# Patient Record
Sex: FEMALE | Race: WHITE | NOT HISPANIC OR LATINO | Employment: FULL TIME
[De-identification: names, ages, dates, MRNs, and addresses within clinical notes are randomized per-mention and may not be internally consistent; named-entity substitution may affect disease eponyms.]

---

## 2019-05-15 ENCOUNTER — TELEPHONE (OUTPATIENT)
Dept: SCHEDULING | Age: 29
End: 2019-05-15

## 2019-05-16 ENCOUNTER — TELEPHONE (OUTPATIENT)
Dept: SCHEDULING | Age: 29
End: 2019-05-16

## 2019-05-16 ENCOUNTER — WALK IN (OUTPATIENT)
Dept: URGENT CARE | Age: 29
End: 2019-05-16

## 2019-05-16 DIAGNOSIS — Z23 NEED FOR VARICELLA VACCINE: Primary | ICD-10-CM

## 2019-05-16 DIAGNOSIS — Z01.84 ENCOUNTER FOR ANTIBODY RESPONSE EXAMINATION: ICD-10-CM

## 2019-05-16 PROCEDURE — 90471 IMMUNIZATION ADMIN: CPT | Performed by: NURSE PRACTITIONER

## 2019-05-16 PROCEDURE — 90716 VAR VACCINE LIVE SUBQ: CPT | Performed by: NURSE PRACTITIONER

## 2019-06-10 ENCOUNTER — TELEPHONE (OUTPATIENT)
Dept: SCHEDULING | Age: 29
End: 2019-06-10

## 2019-06-18 ENCOUNTER — TELEPHONE (OUTPATIENT)
Dept: SCHEDULING | Age: 29
End: 2019-06-18

## 2019-06-20 ENCOUNTER — WALK IN (OUTPATIENT)
Dept: URGENT CARE | Age: 29
End: 2019-06-20

## 2019-06-20 DIAGNOSIS — Z23 NEED FOR VARICELLA VACCINE: Primary | ICD-10-CM

## 2019-06-20 PROCEDURE — 90716 VAR VACCINE LIVE SUBQ: CPT | Performed by: NURSE PRACTITIONER

## 2019-06-20 PROCEDURE — 90471 IMMUNIZATION ADMIN: CPT | Performed by: NURSE PRACTITIONER

## 2019-06-20 RX ORDER — NORGESTIMATE AND ETHINYL ESTRADIOL 7DAYSX3 28
1 KIT ORAL DAILY
COMMUNITY
Start: 2019-05-22 | End: 2021-12-23

## 2019-07-16 ENCOUNTER — TELEPHONE (OUTPATIENT)
Dept: SCHEDULING | Age: 29
End: 2019-07-16

## 2019-07-18 ENCOUNTER — LAB SERVICES (OUTPATIENT)
Dept: LAB | Age: 29
End: 2019-07-18

## 2019-07-18 DIAGNOSIS — Z01.84 ENCOUNTER FOR ANTIBODY RESPONSE EXAMINATION: ICD-10-CM

## 2019-07-18 PROCEDURE — 36415 COLL VENOUS BLD VENIPUNCTURE: CPT | Performed by: NURSE PRACTITIONER

## 2019-07-18 PROCEDURE — 86787 VARICELLA-ZOSTER ANTIBODY: CPT | Performed by: NURSE PRACTITIONER

## 2019-07-19 LAB — VZV IGG SER IA-ACNC: NORMAL

## 2020-10-06 ENCOUNTER — LAB REQUISITION (OUTPATIENT)
Age: 30
End: 2020-10-06
Payer: COMMERCIAL

## 2020-10-06 DIAGNOSIS — Z20.828 CONTACT WITH AND (SUSPECTED) EXPOSURE TO OTHER VIRAL COMMUNICABLE DISEASES: ICD-10-CM

## 2020-10-09 NOTE — PROGRESS NOTES
Results reviewed and noted to be negative. Appropriate [facility] personnel responsible for documenting and following-up with client notified of test results and need to communicate such results to the client.

## 2021-01-23 ENCOUNTER — IMMUNIZATION (OUTPATIENT)
Dept: LAB | Age: 31
End: 2021-01-23

## 2021-01-23 DIAGNOSIS — Z23 NEED FOR VACCINATION: Primary | ICD-10-CM

## 2021-01-23 PROCEDURE — 91301 COVID-19 MODERNA VACCINE: CPT

## 2021-01-23 PROCEDURE — 0011A COVID-19 MODERNA VACCINE: CPT

## 2021-02-20 ENCOUNTER — IMMUNIZATION (OUTPATIENT)
Dept: LAB | Age: 31
End: 2021-02-20

## 2021-02-20 DIAGNOSIS — Z23 NEED FOR VACCINATION: Primary | ICD-10-CM

## 2021-02-20 PROCEDURE — 91301 COVID-19 MODERNA VACCINE: CPT | Performed by: INTERNAL MEDICINE

## 2021-02-20 PROCEDURE — 0012A COVID-19 MODERNA VACCINE: CPT | Performed by: INTERNAL MEDICINE

## 2021-11-11 ENCOUNTER — LAB REQUISITION (OUTPATIENT)
Dept: LAB | Age: 31
End: 2021-11-11

## 2021-11-11 DIAGNOSIS — Z01.84 ENCOUNTER FOR ANTIBODY RESPONSE EXAMINATION: ICD-10-CM

## 2021-11-11 PROCEDURE — 86480 TB TEST CELL IMMUN MEASURE: CPT | Performed by: CLINICAL MEDICAL LABORATORY

## 2021-11-11 PROCEDURE — PSEU9049 QUANTIFERON TB PLUS: Performed by: CLINICAL MEDICAL LABORATORY

## 2021-11-13 LAB
GAMMA INTERFERON BACKGROUND BLD IA-ACNC: 0.02 IU/ML
M TB IFN-G BLD-IMP: NEGATIVE
M TB IFN-G CD4+ BCKGRND COR BLD-ACNC: 0 IU/ML
M TB IFN-G CD4+CD8+ BCKGRND COR BLD-ACNC: 0 IU/ML
MITOGEN IGNF BCKGRD COR BLD-ACNC: 9.98 IU/ML

## 2021-12-06 ENCOUNTER — EMPLOYEE HEALTH (OUTPATIENT)
Dept: OTHER | Age: 31
End: 2021-12-06

## 2021-12-07 ENCOUNTER — EMPLOYEE HEALTH (OUTPATIENT)
Dept: OTHER | Age: 31
End: 2021-12-07

## 2021-12-09 ENCOUNTER — EMPLOYEE HEALTH (OUTPATIENT)
Dept: OTHER | Age: 31
End: 2021-12-09

## 2021-12-09 DIAGNOSIS — Z11.59 SCREENING FOR VIRAL DISEASE: Primary | ICD-10-CM

## 2021-12-17 ENCOUNTER — EMPLOYEE HEALTH (OUTPATIENT)
Dept: OTHER | Age: 31
End: 2021-12-17

## 2021-12-23 ENCOUNTER — OFFICE VISIT (OUTPATIENT)
Dept: OBGYN | Age: 31
End: 2021-12-23

## 2021-12-23 VITALS
SYSTOLIC BLOOD PRESSURE: 90 MMHG | TEMPERATURE: 98.2 F | HEART RATE: 61 BPM | BODY MASS INDEX: 22.48 KG/M2 | DIASTOLIC BLOOD PRESSURE: 49 MMHG | RESPIRATION RATE: 16 BRPM | WEIGHT: 139.9 LBS | HEIGHT: 66 IN

## 2021-12-23 DIAGNOSIS — Z01.419 GYNECOLOGIC EXAM NORMAL: Primary | ICD-10-CM

## 2021-12-23 PROCEDURE — 88175 CYTOPATH C/V AUTO FLUID REDO: CPT | Performed by: CLINICAL MEDICAL LABORATORY

## 2021-12-23 PROCEDURE — 87591 N.GONORRHOEAE DNA AMP PROB: CPT | Performed by: PSYCHIATRY & NEUROLOGY

## 2021-12-23 PROCEDURE — 87491 CHLMYD TRACH DNA AMP PROBE: CPT | Performed by: PSYCHIATRY & NEUROLOGY

## 2021-12-23 PROCEDURE — 99385 PREV VISIT NEW AGE 18-39: CPT | Performed by: OBSTETRICS & GYNECOLOGY

## 2021-12-23 PROCEDURE — 87624 HPV HI-RISK TYP POOLED RSLT: CPT | Performed by: CLINICAL MEDICAL LABORATORY

## 2021-12-23 ASSESSMENT — PAIN SCALES - GENERAL: PAINLEVEL: 0

## 2021-12-27 LAB
C TRACH RRNA SPEC QL NAA+PROBE: NEGATIVE
Lab: NORMAL
N GONORRHOEA RRNA SPEC QL NAA+PROBE: NEGATIVE

## 2021-12-28 LAB
CASE RPRT: NORMAL
CLINICAL INFO: NORMAL
CYTOLOGY CVX/VAG STUDY: NORMAL
HPV16+18+45 E6+E7MRNA CVX NAA+PROBE: NEGATIVE
Lab: NORMAL
PAP EDUCATIONAL NOTE: NORMAL
SPECIMEN ADEQUACY: NORMAL

## 2022-08-11 ENCOUNTER — LAB SERVICES (OUTPATIENT)
Dept: LAB | Age: 32
End: 2022-08-11

## 2022-08-23 ENCOUNTER — EMPLOYEE HEALTH (OUTPATIENT)
Dept: OTHER | Age: 32
End: 2022-08-23

## 2022-08-24 ENCOUNTER — EMPLOYEE HEALTH (OUTPATIENT)
Dept: OTHER | Age: 32
End: 2022-08-24

## 2022-08-25 ENCOUNTER — EMPLOYEE HEALTH (OUTPATIENT)
Dept: OTHER | Age: 32
End: 2022-08-25

## 2022-08-27 ENCOUNTER — EMPLOYEE HEALTH (OUTPATIENT)
Dept: OTHER | Age: 32
End: 2022-08-27

## 2022-10-31 ENCOUNTER — APPOINTMENT (RX ONLY)
Dept: URBAN - METROPOLITAN AREA CLINIC 173 | Facility: CLINIC | Age: 32
Setting detail: DERMATOLOGY
End: 2022-10-31

## 2022-10-31 DIAGNOSIS — L81.1 CHLOASMA: ICD-10-CM

## 2022-10-31 DIAGNOSIS — Z41.9 ENCOUNTER FOR PROCEDURE FOR PURPOSES OTHER THAN REMEDYING HEALTH STATE, UNSPECIFIED: ICD-10-CM

## 2022-10-31 PROCEDURE — ? COUNSELING

## 2022-10-31 PROCEDURE — ? PULSED-DYE LASER

## 2022-10-31 PROCEDURE — ? BOTOX

## 2022-10-31 PROCEDURE — ? FRAXEL

## 2022-10-31 PROCEDURE — ? TREATMENT REGIMEN

## 2022-10-31 PROCEDURE — ? PRESCRIPTION

## 2022-10-31 RX ADMIN — Medication: at 00:00

## 2022-10-31 ASSESSMENT — LOCATION DETAILED DESCRIPTION DERM
LOCATION DETAILED: LEFT CENTRAL MALAR CHEEK
LOCATION DETAILED: RIGHT CENTRAL MALAR CHEEK

## 2022-10-31 ASSESSMENT — LOCATION SIMPLE DESCRIPTION DERM
LOCATION SIMPLE: RIGHT CHEEK
LOCATION SIMPLE: LEFT CHEEK

## 2022-10-31 ASSESSMENT — LOCATION ZONE DERM: LOCATION ZONE: FACE

## 2022-10-31 NOTE — PROCEDURE: BOTOX
Forehead Units: 4
Additional Area 1 Location: face
Show Orbicularis Oculi Units: Yes
Lateral Platysmal Bands Units: 0
Show Right And Left Pupillary Line Units: No
Glabellar Complex Units: 16
Expiration Date (Month Year): 11/24
Detail Level: Detailed
Price (Use Numbers Only, No Special Characters Or $): 099
Periorbital Skin Units: 8
Consent: Written consent obtained. Risks include but not limited to lid/brow ptosis, bruising, swelling and incomplete chemical denervation.
Lot #: [id number]

## 2022-10-31 NOTE — PROCEDURE: PULSED-DYE LASER
Immediate Endpoint: erythema
Spot Size: 7 mm
Spot Size: 10 mm
Cryogen Time (Ms): 30
Cryogen Time (Ms): 30
Pulse Duration: 10 ms
Delay Time (Ms): 20
Delay Time (Ms): 20
Laser Type: Vbeam 595nm
Fluence In J/Cm2 (Optional): 7.75
Post-Care Instructions: I reviewed with the patient in detail post-care instructions. Patient should stay away from the sun and wear sun protection until treated areas are fully healed.
Post-Procedure Care: Ice applied after treatment. Post care reviewed with patient.
Consent: Written consent obtained, risks reviewed including but not limited to crusting, scabbing, blistering, scarring, darker or lighter pigmentary change, incidental hair removal, bruising, and/or incomplete removal.
Location Override: chest
Pulse Duration: 6 ms
Price (Use Numbers Only, No Special Characters Or $): 0
Detail Level: Zone
Fluence In J/Cm2 (Optional): 7.00

## 2022-10-31 NOTE — PROCEDURE: FRAXEL
Number Of Passes: 1
External Cooling Fan Speed: 5
Total Coverage: 20%
Was An Eye Shield Used?: No
Medium Plastic Eye Shield Text: The ocular mucosa was anesthetized with tetracaine. Once adequate anesthesia was optained, medium plastic eye shields were inserted and remained in place until the procedure was completed.
Energy(Mj/Cm2): 40
Post-Care Instructions: I reviewed with the patient in detail post-care instructions. Patient should avoid sun until area fully healed.
Treatment Level: 4
Small Metal Eye Shield Text: The ocular mucosa was anesthetized with tetracaine. Once adequate anesthesia was optained, small metal eye shields were inserted and remained in place until the procedure was completed.
Detail Level: Zone
Total Energy In Kj (Optional- Don't Include Units): 0.75
Wavelength: 1927nm
Number Of Passes: 8
Large Plastic Eye Shield Text: The ocular mucosa was anesthetized with tetracaine. Once adequate anesthesia was optained, large plastic eye shields were inserted and remained in place until the procedure was completed.
Indication: resurfacing
Medium Metal Eye Shield Text: The ocular mucosa was anesthetized with tetracaine. Once adequate anesthesia was optained, medium metal eye shields were inserted and remained in place until the procedure was completed.
Tip: 15mm
Energy(Mj/Cm2): 10
Location: full face
Large Metal Eye Shield Text: The ocular mucosa was anesthetized with tetracaine. Once adequate anesthesia was optained, large metal eye shields were inserted and remained in place until the procedure was completed.
Location: neck
Topical Anesthesia Type: 30% lidocaine, plasticized base
Consent: Written consent obtained, risks reviewed including but not limited to pain and incomplete improvement.
Depth In Microns (Use Numbers Only, No Special Characters Or $): 196
Small Plastic Eye Shield Text: The ocular mucosa was anesthetized with tetracaine. Once adequate anesthesia was optained, small plastic eye shields were inserted and remained in place until the procedure was completed.

## 2022-10-31 NOTE — PROCEDURE: TREATMENT REGIMEN
Detail Level: Zone
Initiate Treatment: Start hydroquinone 8%, tretinoin 0.025%, kojic acid 1%, niacinamide 4%, fluocinolone 0.025% cream in W06 base.  Apply a thin layer nightly for 3 months.\\n\\nStart tretinoin 0.025% gel after completion of hydroquinone treatment

## 2022-11-01 RX ORDER — PHARMACY COMPOUNDING ACCESSORY
G EACH MISCELLANEOUS NIGHTLY
Qty: 30 | Refills: 4 | Status: ERX

## 2022-11-01 RX ORDER — PHARMACY COMPOUNDING ACCESSORY
EACH MISCELLANEOUS
Qty: 30 | Refills: 4 | Status: ERX | COMMUNITY
Start: 2022-10-31

## 2022-12-23 ENCOUNTER — OFFICE VISIT (OUTPATIENT)
Dept: OBGYN | Age: 32
End: 2022-12-23

## 2022-12-23 ENCOUNTER — APPOINTMENT (OUTPATIENT)
Dept: OBGYN | Age: 32
End: 2022-12-23

## 2022-12-23 VITALS
HEART RATE: 71 BPM | DIASTOLIC BLOOD PRESSURE: 54 MMHG | WEIGHT: 142.31 LBS | HEIGHT: 66 IN | BODY MASS INDEX: 22.87 KG/M2 | TEMPERATURE: 98.4 F | RESPIRATION RATE: 16 BRPM | SYSTOLIC BLOOD PRESSURE: 95 MMHG

## 2022-12-23 DIAGNOSIS — Z01.419 GYNECOLOGIC EXAM NORMAL: Primary | ICD-10-CM

## 2022-12-23 PROCEDURE — 99395 PREV VISIT EST AGE 18-39: CPT | Performed by: OBSTETRICS & GYNECOLOGY

## 2022-12-23 ASSESSMENT — PAIN SCALES - GENERAL: PAINLEVEL: 0

## 2023-04-18 ENCOUNTER — RX ONLY (OUTPATIENT)
Age: 33
Setting detail: RX ONLY
End: 2023-04-18

## 2023-04-18 ENCOUNTER — APPOINTMENT (RX ONLY)
Dept: URBAN - METROPOLITAN AREA CLINIC 173 | Facility: CLINIC | Age: 33
Setting detail: DERMATOLOGY
End: 2023-04-18

## 2023-04-18 DIAGNOSIS — Z41.9 ENCOUNTER FOR PROCEDURE FOR PURPOSES OTHER THAN REMEDYING HEALTH STATE, UNSPECIFIED: ICD-10-CM

## 2023-04-18 PROCEDURE — ? FRAXEL

## 2023-04-18 PROCEDURE — ? BOTOX

## 2023-04-18 RX ORDER — PHARMACY COMPOUNDING ACCESSORY
EACH MISCELLANEOUS
Qty: 60 | Refills: 2 | Status: ERX

## 2023-04-18 NOTE — PROCEDURE: BOTOX
Forehead Units: 0
Additional Area 1 Location: face
Show Orbicularis Oculi Units: Yes
Dilution (U/0.1 Cc): 4
Show Right And Left Pupillary Line Units: No
Additional Area 1 Units: 18
Expiration Date (Month Year): 11/24
Detail Level: Detailed
Price (Use Numbers Only, No Special Characters Or $): 379
Consent: Written consent obtained. Risks include but not limited to lid/brow ptosis, bruising, swelling and incomplete chemical denervation.
Lot #: [id number]

## 2023-04-18 NOTE — PROCEDURE: FRAXEL
Number Of Passes: 1
External Cooling Fan Speed: 5
Total Coverage: 20%
Length Of Topical Anesthesia Application (Optional): 60 minutes
Price (Use Numbers Only, No Special Characters Or $): 914
Was An Eye Shield Used?: No
Medium Plastic Eye Shield Text: The ocular mucosa was anesthetized with tetracaine. Once adequate anesthesia was optained, medium plastic eye shields were inserted and remained in place until the procedure was completed.
Energy(Mj/Cm2): 40
Post-Care Instructions: I reviewed with the patient in detail post-care instructions. Patient should avoid sun until area fully healed.
Treatment Level: 4
Small Metal Eye Shield Text: The ocular mucosa was anesthetized with tetracaine. Once adequate anesthesia was optained, small metal eye shields were inserted and remained in place until the procedure was completed.
Detail Level: Zone
Total Energy In Kj (Optional- Don't Include Units): 0.71
Wavelength: 1927nm
Number Of Passes: 8
Large Plastic Eye Shield Text: The ocular mucosa was anesthetized with tetracaine. Once adequate anesthesia was optained, large plastic eye shields were inserted and remained in place until the procedure was completed.
Indication: resurfacing
Medium Metal Eye Shield Text: The ocular mucosa was anesthetized with tetracaine. Once adequate anesthesia was optained, medium metal eye shields were inserted and remained in place until the procedure was completed.
Tip: 15mm
Energy(Mj/Cm2): 10
Treatment Number: 2
Location: full face
Large Metal Eye Shield Text: The ocular mucosa was anesthetized with tetracaine. Once adequate anesthesia was optained, large metal eye shields were inserted and remained in place until the procedure was completed.
Location: neck
Topical Anesthesia Type: 30% lidocaine, plasticized base
Consent: Written consent obtained, risks reviewed including but not limited to pain and incomplete improvement.
Depth In Microns (Use Numbers Only, No Special Characters Or $): 196
Small Plastic Eye Shield Text: The ocular mucosa was anesthetized with tetracaine. Once adequate anesthesia was optained, small plastic eye shields were inserted and remained in place until the procedure was completed.

## 2023-05-13 ENCOUNTER — IMAGING SERVICES (OUTPATIENT)
Dept: GENERAL RADIOLOGY | Age: 33
End: 2023-05-13
Attending: EMERGENCY MEDICINE

## 2023-05-13 ENCOUNTER — WALK IN (OUTPATIENT)
Dept: URGENT CARE | Age: 33
End: 2023-05-13

## 2023-05-13 VITALS
WEIGHT: 142.53 LBS | BODY MASS INDEX: 23 KG/M2 | DIASTOLIC BLOOD PRESSURE: 69 MMHG | SYSTOLIC BLOOD PRESSURE: 106 MMHG | TEMPERATURE: 97.6 F | HEART RATE: 72 BPM | OXYGEN SATURATION: 99 %

## 2023-05-13 DIAGNOSIS — M79.672 LEFT FOOT PAIN: ICD-10-CM

## 2023-05-13 DIAGNOSIS — M79.672 LEFT FOOT PAIN: Primary | ICD-10-CM

## 2023-05-13 PROCEDURE — 73630 X-RAY EXAM OF FOOT: CPT | Performed by: RADIOLOGY

## 2023-05-13 PROCEDURE — 99213 OFFICE O/P EST LOW 20 MIN: CPT | Performed by: EMERGENCY MEDICINE

## 2023-05-13 RX ORDER — SPIRONOLACTONE 100 MG/1
100 TABLET, FILM COATED ORAL AT BEDTIME
COMMUNITY
Start: 2023-04-19

## 2023-05-14 ASSESSMENT — ENCOUNTER SYMPTOMS
WEAKNESS: 0
RHINORRHEA: 0
LIGHT-HEADEDNESS: 0
NAUSEA: 0
VOMITING: 0
COUGH: 0
CONSTIPATION: 0
CHILLS: 0
SORE THROAT: 0
SHORTNESS OF BREATH: 0
FATIGUE: 0
BLOOD IN STOOL: 0
SINUS PRESSURE: 0
DIARRHEA: 0
TROUBLE SWALLOWING: 0
FEVER: 0
WHEEZING: 0
HEADACHES: 0
DIZZINESS: 0
ABDOMINAL PAIN: 0
ACTIVITY CHANGE: 0

## 2023-08-21 ENCOUNTER — TELEPHONE (OUTPATIENT)
Dept: OTHER | Age: 33
End: 2023-08-21

## 2023-08-29 ENCOUNTER — OFFICE VISIT (OUTPATIENT)
Dept: GASTROENTEROLOGY | Age: 33
End: 2023-08-29

## 2023-08-29 VITALS
BODY MASS INDEX: 22.61 KG/M2 | HEART RATE: 60 BPM | WEIGHT: 140.7 LBS | HEIGHT: 66 IN | SYSTOLIC BLOOD PRESSURE: 95 MMHG | DIASTOLIC BLOOD PRESSURE: 61 MMHG

## 2023-08-29 DIAGNOSIS — K58.1 IRRITABLE BOWEL SYNDROME WITH CONSTIPATION: Primary | ICD-10-CM

## 2023-08-29 PROCEDURE — 99204 OFFICE O/P NEW MOD 45 MIN: CPT | Performed by: INTERNAL MEDICINE

## 2023-08-30 ENCOUNTER — TELEPHONE (OUTPATIENT)
Dept: GASTROENTEROLOGY | Age: 33
End: 2023-08-30

## 2023-08-30 DIAGNOSIS — K58.1 IRRITABLE BOWEL SYNDROME WITH CONSTIPATION: Primary | ICD-10-CM

## 2023-09-01 ENCOUNTER — TELEPHONE (OUTPATIENT)
Dept: OBGYN | Age: 33
End: 2023-09-01

## 2023-09-01 ENCOUNTER — OFFICE VISIT (OUTPATIENT)
Dept: OBGYN | Age: 33
End: 2023-09-01

## 2023-09-01 VITALS
HEART RATE: 70 BPM | DIASTOLIC BLOOD PRESSURE: 66 MMHG | TEMPERATURE: 98.1 F | BODY MASS INDEX: 22.71 KG/M2 | WEIGHT: 141.3 LBS | HEIGHT: 66 IN | SYSTOLIC BLOOD PRESSURE: 101 MMHG

## 2023-09-01 DIAGNOSIS — Z30.011 ENCOUNTER FOR INITIAL PRESCRIPTION OF CONTRACEPTIVE PILLS: ICD-10-CM

## 2023-09-01 DIAGNOSIS — Z30.432 ENCOUNTER FOR IUD REMOVAL: Primary | ICD-10-CM

## 2023-09-01 PROCEDURE — 58301 REMOVE INTRAUTERINE DEVICE: CPT | Performed by: OBSTETRICS & GYNECOLOGY

## 2023-09-01 RX ORDER — NORGESTIMATE AND ETHINYL ESTRADIOL 7DAYSX3 28
1 KIT ORAL DAILY
Qty: 84 TABLET | Refills: 3 | Status: SHIPPED | OUTPATIENT
Start: 2023-09-01

## 2023-09-01 RX ORDER — NORGESTIMATE AND ETHINYL ESTRADIOL 7DAYSX3 28
1 KIT ORAL DAILY
Qty: 84 TABLET | OUTPATIENT
Start: 2023-09-01

## 2023-09-01 RX ORDER — NORGESTIMATE AND ETHINYL ESTRADIOL 7DAYSX3 28
1 KIT ORAL DAILY
Qty: 30 TABLET | Refills: 0 | Status: SHIPPED | OUTPATIENT
Start: 2023-09-01

## 2023-09-01 ASSESSMENT — PAIN SCALES - GENERAL: PAINLEVEL: 0

## 2023-10-12 ENCOUNTER — EXTERNAL RECORD (OUTPATIENT)
Dept: HEALTH INFORMATION MANAGEMENT | Facility: OTHER | Age: 33
End: 2023-10-12

## 2023-10-12 ENCOUNTER — HOSPITAL ENCOUNTER (OUTPATIENT)
Dept: GASTROENTEROLOGY | Age: 33
Discharge: HOME | End: 2023-10-12
Attending: INTERNAL MEDICINE

## 2023-10-12 DIAGNOSIS — K58.1 IRRITABLE BOWEL SYNDROME WITH CONSTIPATION: ICD-10-CM

## 2023-10-12 PROCEDURE — 91122 ANAL PRESSURE RECORD: CPT | Performed by: STUDENT IN AN ORGANIZED HEALTH CARE EDUCATION/TRAINING PROGRAM

## 2023-10-12 PROCEDURE — 13000023 HB GI BASIC CASE EA ADD MINUTE

## 2023-10-12 PROCEDURE — 91120 RECTAL SENSATION TONE & COMPLIANCE TEST: CPT | Performed by: STUDENT IN AN ORGANIZED HEALTH CARE EDUCATION/TRAINING PROGRAM

## 2023-10-12 PROCEDURE — 13000022 HB GI BASIC CASE  S/U +1ST 15 MIN

## 2023-11-25 ENCOUNTER — E-ADVICE (OUTPATIENT)
Dept: OBGYN | Age: 33
End: 2023-11-25

## 2023-11-25 DIAGNOSIS — Z30.011 ENCOUNTER FOR INITIAL PRESCRIPTION OF CONTRACEPTIVE PILLS: Primary | ICD-10-CM

## 2023-11-27 RX ORDER — NORGESTIMATE AND ETHINYL ESTRADIOL 7DAYSX3 28
1 KIT ORAL DAILY
Qty: 84 TABLET | Refills: 0 | Status: SHIPPED | OUTPATIENT
Start: 2023-11-27

## 2023-12-07 ENCOUNTER — CLINICAL ABSTRACT (OUTPATIENT)
Dept: HEALTH INFORMATION MANAGEMENT | Facility: OTHER | Age: 33
End: 2023-12-07

## 2023-12-07 ENCOUNTER — APPOINTMENT (OUTPATIENT)
Dept: GASTROENTEROLOGY | Age: 33
End: 2023-12-07

## 2023-12-07 VITALS
DIASTOLIC BLOOD PRESSURE: 63 MMHG | HEIGHT: 66 IN | SYSTOLIC BLOOD PRESSURE: 101 MMHG | BODY MASS INDEX: 22.64 KG/M2 | WEIGHT: 140.9 LBS

## 2023-12-07 DIAGNOSIS — K58.1 IRRITABLE BOWEL SYNDROME WITH CONSTIPATION: Primary | ICD-10-CM

## 2023-12-07 DIAGNOSIS — K63.8219 SMALL INTESTINAL BACTERIAL OVERGROWTH (SIBO): ICD-10-CM

## 2023-12-07 PROCEDURE — 99214 OFFICE O/P EST MOD 30 MIN: CPT | Performed by: INTERNAL MEDICINE

## 2023-12-28 ENCOUNTER — APPOINTMENT (OUTPATIENT)
Dept: OBGYN | Age: 33
End: 2023-12-28

## 2023-12-29 ENCOUNTER — E-ADVICE (OUTPATIENT)
Dept: OTHER | Age: 33
End: 2023-12-29

## 2024-01-08 ENCOUNTER — APPOINTMENT (OUTPATIENT)
Dept: OBGYN | Age: 34
End: 2024-01-08

## 2024-01-08 VITALS
HEIGHT: 66 IN | RESPIRATION RATE: 16 BRPM | WEIGHT: 141 LBS | DIASTOLIC BLOOD PRESSURE: 54 MMHG | SYSTOLIC BLOOD PRESSURE: 108 MMHG | HEART RATE: 59 BPM | BODY MASS INDEX: 22.66 KG/M2

## 2024-01-08 DIAGNOSIS — Z30.011 ENCOUNTER FOR INITIAL PRESCRIPTION OF CONTRACEPTIVE PILLS: ICD-10-CM

## 2024-01-08 DIAGNOSIS — Z01.419 GYNECOLOGIC EXAM NORMAL: Primary | ICD-10-CM

## 2024-01-08 PROCEDURE — 87624 HPV HI-RISK TYP POOLED RSLT: CPT | Performed by: CLINICAL MEDICAL LABORATORY

## 2024-01-08 PROCEDURE — 88175 CYTOPATH C/V AUTO FLUID REDO: CPT | Performed by: INTERNAL MEDICINE

## 2024-01-08 PROCEDURE — 99395 PREV VISIT EST AGE 18-39: CPT | Performed by: OBSTETRICS & GYNECOLOGY

## 2024-01-08 RX ORDER — NORGESTIMATE AND ETHINYL ESTRADIOL 7DAYSX3 28
1 KIT ORAL DAILY
Qty: 84 TABLET | Refills: 3 | Status: SHIPPED | OUTPATIENT
Start: 2024-01-08

## 2024-01-08 ASSESSMENT — PAIN SCALES - GENERAL: PAINLEVEL: 0

## 2024-01-15 LAB
CASE RPRT: ABNORMAL
CLINICAL INFO: ABNORMAL
CYTOLOGY CVX/VAG STUDY: ABNORMAL
CYTOLOGY CVX/VAG STUDY: ABNORMAL
GEN CATEG CVX/VAG CYTO-IMP: ABNORMAL
HPV16+18+45 E6+E7MRNA CVX NAA+PROBE: NEGATIVE
Lab: NORMAL
PAP EDUCATIONAL NOTE: ABNORMAL
SPECIMEN ADEQUACY: ABNORMAL

## 2024-01-18 ENCOUNTER — TELEPHONE (OUTPATIENT)
Dept: OBGYN | Age: 34
End: 2024-01-18

## 2024-01-18 ENCOUNTER — E-ADVICE (OUTPATIENT)
Dept: OBGYN | Age: 34
End: 2024-01-18

## 2024-02-01 ENCOUNTER — TELEPHONE (OUTPATIENT)
Dept: OBGYN | Age: 34
End: 2024-02-01

## 2024-02-05 ENCOUNTER — APPOINTMENT (OUTPATIENT)
Dept: OBGYN | Age: 34
End: 2024-02-05

## 2024-02-05 VITALS
RESPIRATION RATE: 16 BRPM | WEIGHT: 143.9 LBS | DIASTOLIC BLOOD PRESSURE: 66 MMHG | HEIGHT: 66 IN | BODY MASS INDEX: 23.13 KG/M2 | SYSTOLIC BLOOD PRESSURE: 109 MMHG | HEART RATE: 74 BPM

## 2024-02-05 DIAGNOSIS — R87.612 LGSIL ON PAP SMEAR OF CERVIX: Primary | ICD-10-CM

## 2024-02-05 DIAGNOSIS — Z11.51 SCREENING FOR HPV (HUMAN PAPILLOMAVIRUS): ICD-10-CM

## 2024-02-05 LAB
B-HCG UR QL: NEGATIVE
INTERNAL PROCEDURAL CONTROLS ACCEPTABLE: YES
TEST LOT EXPIRATION DATE: NORMAL
TEST LOT NUMBER: NORMAL

## 2024-02-05 PROCEDURE — 57454 BX/CURETT OF CERVIX W/SCOPE: CPT | Performed by: OBSTETRICS & GYNECOLOGY

## 2024-02-05 PROCEDURE — 81025 URINE PREGNANCY TEST: CPT | Performed by: OBSTETRICS & GYNECOLOGY

## 2024-02-05 ASSESSMENT — PAIN SCALES - GENERAL: PAINLEVEL: 0

## 2024-02-08 LAB
ASR DISCLAIMER: NORMAL
CASE RPRT: NORMAL
CLINICAL INFO: NORMAL
PATH REPORT.FINAL DX SPEC: NORMAL
PATH REPORT.GROSS SPEC: NORMAL

## 2024-02-12 ENCOUNTER — TELEPHONE (OUTPATIENT)
Dept: GASTROENTEROLOGY | Age: 34
End: 2024-02-12

## 2024-03-14 ENCOUNTER — APPOINTMENT (OUTPATIENT)
Dept: GASTROENTEROLOGY | Age: 34
End: 2024-03-14

## 2024-03-27 ENCOUNTER — APPOINTMENT (OUTPATIENT)
Dept: INTERNAL MEDICINE | Age: 34
End: 2024-03-27

## 2024-03-28 ENCOUNTER — APPOINTMENT (OUTPATIENT)
Dept: GASTROENTEROLOGY | Age: 34
End: 2024-03-28

## 2024-03-28 VITALS
DIASTOLIC BLOOD PRESSURE: 61 MMHG | BODY MASS INDEX: 22.76 KG/M2 | HEART RATE: 62 BPM | WEIGHT: 141.6 LBS | SYSTOLIC BLOOD PRESSURE: 100 MMHG | HEIGHT: 66 IN

## 2024-03-28 DIAGNOSIS — K59.04 CHRONIC IDIOPATHIC CONSTIPATION: Primary | ICD-10-CM

## 2024-03-28 DIAGNOSIS — K63.8219 SMALL INTESTINAL BACTERIAL OVERGROWTH (SIBO): ICD-10-CM

## 2024-03-28 DIAGNOSIS — K58.1 IRRITABLE BOWEL SYNDROME WITH CONSTIPATION: ICD-10-CM

## 2024-03-28 DIAGNOSIS — R14.0 BLOATING: ICD-10-CM

## 2024-03-28 PROCEDURE — 99214 OFFICE O/P EST MOD 30 MIN: CPT | Performed by: INTERNAL MEDICINE

## 2024-03-29 ENCOUNTER — TELEPHONE (OUTPATIENT)
Dept: GASTROENTEROLOGY | Age: 34
End: 2024-03-29

## 2024-03-29 ENCOUNTER — APPOINTMENT (OUTPATIENT)
Dept: INTERNAL MEDICINE | Age: 34
End: 2024-03-29

## 2024-03-29 ENCOUNTER — LAB SERVICES (OUTPATIENT)
Dept: LAB | Age: 34
End: 2024-03-29

## 2024-03-29 VITALS
OXYGEN SATURATION: 97 % | BODY MASS INDEX: 22.43 KG/M2 | WEIGHT: 139.55 LBS | TEMPERATURE: 97.7 F | RESPIRATION RATE: 16 BRPM | SYSTOLIC BLOOD PRESSURE: 97 MMHG | DIASTOLIC BLOOD PRESSURE: 53 MMHG | HEART RATE: 73 BPM | HEIGHT: 66 IN

## 2024-03-29 DIAGNOSIS — Z00.00 ANNUAL PHYSICAL EXAM: ICD-10-CM

## 2024-03-29 DIAGNOSIS — R14.0 BLOATING: ICD-10-CM

## 2024-03-29 DIAGNOSIS — K58.1 IRRITABLE BOWEL SYNDROME WITH CONSTIPATION: Primary | ICD-10-CM

## 2024-03-29 DIAGNOSIS — K59.04 CHRONIC IDIOPATHIC CONSTIPATION: ICD-10-CM

## 2024-03-29 LAB
ALBUMIN SERPL-MCNC: 3.4 G/DL (ref 3.6–5.1)
ALBUMIN/GLOB SERPL: 1.2 {RATIO} (ref 1–2.4)
ALP SERPL-CCNC: 33 UNITS/L (ref 45–117)
ALT SERPL-CCNC: 20 UNITS/L
ANION GAP SERPL CALC-SCNC: 7 MMOL/L (ref 7–19)
AST SERPL-CCNC: 17 UNITS/L
BILIRUB SERPL-MCNC: 0.6 MG/DL (ref 0.2–1)
BUN SERPL-MCNC: 19 MG/DL (ref 6–20)
BUN/CREAT SERPL: 23 (ref 7–25)
CALCIUM SERPL-MCNC: 9 MG/DL (ref 8.4–10.2)
CHLORIDE SERPL-SCNC: 107 MMOL/L (ref 97–110)
CHOLEST SERPL-MCNC: 157 MG/DL
CHOLEST/HDLC SERPL: 2.5 {RATIO}
CO2 SERPL-SCNC: 28 MMOL/L (ref 21–32)
CREAT SERPL-MCNC: 0.82 MG/DL (ref 0.51–0.95)
CRP SERPL-MCNC: <3 MG/L
DEPRECATED RDW RBC: 41.1 FL (ref 39–50)
EGFRCR SERPLBLD CKD-EPI 2021: >90 ML/MIN/{1.73_M2}
ERYTHROCYTE [DISTWIDTH] IN BLOOD: 11.9 % (ref 11–15)
FASTING DURATION TIME PATIENT: 12 HOURS (ref 0–999)
GLOBULIN SER-MCNC: 2.9 G/DL (ref 2–4)
GLUCOSE SERPL-MCNC: 97 MG/DL (ref 70–99)
HBA1C MFR BLD: 4.8 % (ref 4.5–5.6)
HCT VFR BLD CALC: 40.2 % (ref 36–46.5)
HDLC SERPL-MCNC: 64 MG/DL
HGB BLD-MCNC: 13.2 G/DL (ref 12–15.5)
IGA SERPL-MCNC: 211 MG/DL (ref 70–400)
LDLC SERPL CALC-MCNC: 81 MG/DL
MCH RBC QN AUTO: 31.4 PG (ref 26–34)
MCHC RBC AUTO-ENTMCNC: 32.8 G/DL (ref 32–36.5)
MCV RBC AUTO: 95.5 FL (ref 78–100)
NONHDLC SERPL-MCNC: 93 MG/DL
NRBC BLD MANUAL-RTO: 0 /100 WBC
PLATELET # BLD AUTO: 301 K/MCL (ref 140–450)
POTASSIUM SERPL-SCNC: 4 MMOL/L (ref 3.4–5.1)
PROT SERPL-MCNC: 6.3 G/DL (ref 6.4–8.2)
RBC # BLD: 4.21 MIL/MCL (ref 4–5.2)
SODIUM SERPL-SCNC: 138 MMOL/L (ref 135–145)
TRIGL SERPL-MCNC: 62 MG/DL
TSH SERPL-ACNC: 0.97 MCUNITS/ML (ref 0.35–5)
WBC # BLD: 6.4 K/MCL (ref 4.2–11)

## 2024-03-29 PROCEDURE — 83036 HEMOGLOBIN GLYCOSYLATED A1C: CPT | Performed by: CLINICAL MEDICAL LABORATORY

## 2024-03-29 PROCEDURE — 86364 TISS TRNSGLTMNASE EA IG CLAS: CPT | Performed by: CLINICAL MEDICAL LABORATORY

## 2024-03-29 PROCEDURE — 80061 LIPID PANEL: CPT | Performed by: CLINICAL MEDICAL LABORATORY

## 2024-03-29 PROCEDURE — 80053 COMPREHEN METABOLIC PANEL: CPT | Performed by: CLINICAL MEDICAL LABORATORY

## 2024-03-29 PROCEDURE — 36415 COLL VENOUS BLD VENIPUNCTURE: CPT | Performed by: INTERNAL MEDICINE

## 2024-03-29 PROCEDURE — 84443 ASSAY THYROID STIM HORMONE: CPT | Performed by: CLINICAL MEDICAL LABORATORY

## 2024-03-29 PROCEDURE — 82784 ASSAY IGA/IGD/IGG/IGM EACH: CPT | Performed by: CLINICAL MEDICAL LABORATORY

## 2024-03-29 PROCEDURE — 86258 DGP ANTIBODY EACH IG CLASS: CPT | Performed by: CLINICAL MEDICAL LABORATORY

## 2024-03-29 PROCEDURE — 86140 C-REACTIVE PROTEIN: CPT | Performed by: CLINICAL MEDICAL LABORATORY

## 2024-03-29 PROCEDURE — 85027 COMPLETE CBC AUTOMATED: CPT | Performed by: CLINICAL MEDICAL LABORATORY

## 2024-03-29 ASSESSMENT — ENCOUNTER SYMPTOMS
ABDOMINAL DISTENTION: 0
EYE PAIN: 0
EYE REDNESS: 0
CHILLS: 0
BLOOD IN STOOL: 0
POLYPHAGIA: 0
WEAKNESS: 0
FEVER: 0
ABDOMINAL PAIN: 0
HEADACHES: 0
FATIGUE: 0
DIZZINESS: 0
SHORTNESS OF BREATH: 0
POLYDIPSIA: 0
COUGH: 0

## 2024-03-29 ASSESSMENT — PATIENT HEALTH QUESTIONNAIRE - PHQ9
CLINICAL INTERPRETATION OF PHQ2 SCORE: NO FURTHER SCREENING NEEDED
SUM OF ALL RESPONSES TO PHQ9 QUESTIONS 1 AND 2: 0
2. FEELING DOWN, DEPRESSED OR HOPELESS: NOT AT ALL
SUM OF ALL RESPONSES TO PHQ9 QUESTIONS 1 AND 2: 0
1. LITTLE INTEREST OR PLEASURE IN DOING THINGS: NOT AT ALL

## 2024-03-29 ASSESSMENT — PAIN SCALES - GENERAL: PAINLEVEL: 0

## 2024-04-01 LAB
GLIADIN PEPTIDE IGG SER-ACNC: 3 UNITS
TTG IGA SER IA-ACNC: 1 U/ML

## 2024-04-07 ENCOUNTER — E-ADVICE (OUTPATIENT)
Dept: GASTROENTEROLOGY | Age: 34
End: 2024-04-07

## 2024-04-10 ENCOUNTER — APPOINTMENT (OUTPATIENT)
Dept: GASTROENTEROLOGY | Age: 34
End: 2024-04-10

## 2024-04-10 VITALS
SYSTOLIC BLOOD PRESSURE: 103 MMHG | WEIGHT: 139 LBS | HEART RATE: 65 BPM | HEIGHT: 66 IN | DIASTOLIC BLOOD PRESSURE: 64 MMHG | BODY MASS INDEX: 22.34 KG/M2

## 2024-04-10 DIAGNOSIS — K59.00 CONSTIPATION, UNSPECIFIED CONSTIPATION TYPE: ICD-10-CM

## 2024-04-10 DIAGNOSIS — R14.0 BLOATING: Primary | ICD-10-CM

## 2024-05-03 ENCOUNTER — E-ADVICE (OUTPATIENT)
Dept: GASTROENTEROLOGY | Age: 34
End: 2024-05-03

## 2024-05-06 ENCOUNTER — RX ONLY (OUTPATIENT)
Age: 34
Setting detail: RX ONLY
End: 2024-05-06

## 2024-05-06 RX ORDER — PHARMACY COMPOUNDING ACCESSORY
EACH MISCELLANEOUS
Qty: 30 | Refills: 4 | Status: ERX

## 2024-05-15 ENCOUNTER — APPOINTMENT (OUTPATIENT)
Dept: GASTROENTEROLOGY | Age: 34
End: 2024-05-15

## 2024-05-15 VITALS
HEART RATE: 60 BPM | WEIGHT: 139 LBS | DIASTOLIC BLOOD PRESSURE: 68 MMHG | BODY MASS INDEX: 22.34 KG/M2 | SYSTOLIC BLOOD PRESSURE: 106 MMHG | HEIGHT: 66 IN | TEMPERATURE: 98 F

## 2024-05-15 DIAGNOSIS — R14.0 BLOATING: Primary | ICD-10-CM

## 2024-05-15 DIAGNOSIS — K58.1 IRRITABLE BOWEL SYNDROME WITH CONSTIPATION: ICD-10-CM

## 2024-05-15 PROCEDURE — 99214 OFFICE O/P EST MOD 30 MIN: CPT | Performed by: INTERNAL MEDICINE

## 2024-05-29 ENCOUNTER — E-ADVICE (OUTPATIENT)
Dept: GASTROENTEROLOGY | Age: 34
End: 2024-05-29

## 2024-06-19 ENCOUNTER — APPOINTMENT (OUTPATIENT)
Dept: GASTROENTEROLOGY | Age: 34
End: 2024-06-19

## 2024-06-19 VITALS
DIASTOLIC BLOOD PRESSURE: 60 MMHG | WEIGHT: 135 LBS | HEART RATE: 65 BPM | HEIGHT: 65 IN | SYSTOLIC BLOOD PRESSURE: 95 MMHG | BODY MASS INDEX: 22.49 KG/M2

## 2024-06-19 DIAGNOSIS — R14.0 BLOATING: Primary | ICD-10-CM

## 2024-06-19 PROCEDURE — 99214 OFFICE O/P EST MOD 30 MIN: CPT | Performed by: INTERNAL MEDICINE

## 2024-07-31 ENCOUNTER — APPOINTMENT (OUTPATIENT)
Dept: GASTROENTEROLOGY | Age: 34
End: 2024-07-31

## 2024-11-04 ENCOUNTER — E-ADVICE (OUTPATIENT)
Dept: OBGYN | Age: 34
End: 2024-11-04

## 2025-01-09 DIAGNOSIS — Z30.011 ENCOUNTER FOR INITIAL PRESCRIPTION OF CONTRACEPTIVE PILLS: ICD-10-CM

## 2025-01-09 RX ORDER — NORGESTIMATE AND ETHINYL ESTRADIOL 7DAYSX3 28
1 KIT ORAL DAILY
Qty: 84 TABLET | Refills: 0 | Status: SHIPPED | OUTPATIENT
Start: 2025-01-09

## 2025-01-23 SDOH — ECONOMIC STABILITY: HOUSING INSECURITY: DO YOU HAVE PROBLEMS WITH ANY OF THE FOLLOWING?: NONE OF THE ABOVE

## 2025-01-23 SDOH — ECONOMIC STABILITY: FOOD INSECURITY: WITHIN THE PAST 12 MONTHS, THE FOOD YOU BOUGHT JUST DIDN'T LAST AND YOU DIDN'T HAVE MONEY TO GET MORE.: NEVER TRUE

## 2025-01-23 SDOH — ECONOMIC STABILITY: HOUSING INSECURITY: WHAT IS YOUR LIVING SITUATION TODAY?: I HAVE A STEADY PLACE TO LIVE

## 2025-01-23 SDOH — ECONOMIC STABILITY: TRANSPORTATION INSECURITY
IN THE PAST 12 MONTHS, HAS LACK OF RELIABLE TRANSPORTATION KEPT YOU FROM MEDICAL APPOINTMENTS, MEETINGS, WORK OR FROM GETTING THINGS NEEDED FOR DAILY LIVING?: NO

## 2025-01-23 SDOH — ECONOMIC STABILITY: GENERAL: WOULD YOU LIKE HELP WITH ANY OF THE FOLLOWING NEEDS?: I DON'T WANT HELP WITH ANY OF THESE

## 2025-01-23 ASSESSMENT — SOCIAL DETERMINANTS OF HEALTH (SDOH): IN THE PAST 12 MONTHS, HAS THE ELECTRIC, GAS, OIL, OR WATER COMPANY THREATENED TO SHUT OFF SERVICE IN YOUR HOME?: NO

## 2025-01-30 ENCOUNTER — APPOINTMENT (OUTPATIENT)
Dept: OBGYN | Age: 35
End: 2025-01-30

## 2025-01-30 VITALS
DIASTOLIC BLOOD PRESSURE: 67 MMHG | HEIGHT: 65 IN | SYSTOLIC BLOOD PRESSURE: 108 MMHG | WEIGHT: 130.3 LBS | HEART RATE: 78 BPM | BODY MASS INDEX: 21.71 KG/M2 | TEMPERATURE: 98.4 F

## 2025-01-30 DIAGNOSIS — Z01.419 WELL WOMAN EXAM WITH ROUTINE GYNECOLOGICAL EXAM: Primary | ICD-10-CM

## 2025-01-30 DIAGNOSIS — Z30.011 ENCOUNTER FOR INITIAL PRESCRIPTION OF CONTRACEPTIVE PILLS: ICD-10-CM

## 2025-01-30 PROCEDURE — 87591 N.GONORRHOEAE DNA AMP PROB: CPT | Performed by: CLINICAL MEDICAL LABORATORY

## 2025-01-30 PROCEDURE — 87491 CHLMYD TRACH DNA AMP PROBE: CPT | Performed by: CLINICAL MEDICAL LABORATORY

## 2025-01-30 RX ORDER — NORGESTIMATE AND ETHINYL ESTRADIOL 7DAYSX3 28
1 KIT ORAL DAILY
Qty: 84 TABLET | Refills: 3 | Status: SHIPPED | OUTPATIENT
Start: 2025-01-30

## 2025-01-30 ASSESSMENT — PAIN SCALES - GENERAL: PAINLEVEL: 0

## 2025-01-31 LAB
C TRACH RRNA CVX QL NAA+PROBE: NEGATIVE
Lab: NORMAL
Lab: NORMAL
N GONORRHOEA RRNA CVX QL NAA+PROBE: NEGATIVE

## 2025-02-01 ENCOUNTER — E-ADVICE (OUTPATIENT)
Dept: OBGYN | Age: 35
End: 2025-02-01

## 2025-02-07 LAB
CASE RPRT: NORMAL
CYTOLOGY CVX/VAG STUDY: NORMAL
HPV16+18+45 E6+E7MRNA CVX NAA+PROBE: NEGATIVE
Lab: NORMAL
PAP EDUCATIONAL NOTE: NORMAL
SPECIMEN ADEQUACY: NORMAL

## 2025-02-18 ENCOUNTER — E-ADVICE (OUTPATIENT)
Dept: OBGYN | Age: 35
End: 2025-02-18

## 2025-04-10 ENCOUNTER — APPOINTMENT (OUTPATIENT)
Dept: URBAN - METROPOLITAN AREA CLINIC 173 | Facility: CLINIC | Age: 35
Setting detail: DERMATOLOGY
End: 2025-04-10

## 2025-04-10 DIAGNOSIS — Z41.9 ENCOUNTER FOR PROCEDURE FOR PURPOSES OTHER THAN REMEDYING HEALTH STATE, UNSPECIFIED: ICD-10-CM

## 2025-04-10 PROCEDURE — ? BOTOX

## 2025-04-10 NOTE — PROCEDURE: BOTOX
Forehead Units: 0
Additional Area 1 Location: face
Show Orbicularis Oculi Units: Yes
Dilution (U/0.1 Cc): 4
Show Right And Left Pupillary Line Units: No
Additional Area 1 Units: 21
Expiration Date (Month Year): 05/2027
Detail Level: Detailed
Consent: Written consent obtained. Risks include but not limited to lid/brow ptosis, bruising, swelling and incomplete chemical denervation.
Lot #: [id number] *JKEO Special*

## 2025-05-25 ENCOUNTER — TELEPHONE (OUTPATIENT)
Dept: INTERNAL MEDICINE | Age: 35
End: 2025-05-25

## 2025-07-01 RX ORDER — SPIRONOLACTONE 100 MG/1
100 TABLET, FILM COATED ORAL DAILY
Qty: 90 TABLET | Refills: 1 | OUTPATIENT
Start: 2025-07-01

## 2025-07-06 SDOH — ECONOMIC STABILITY: HOUSING INSECURITY: DO YOU HAVE PROBLEMS WITH ANY OF THE FOLLOWING?: NONE OF THE ABOVE

## 2025-07-06 SDOH — ECONOMIC STABILITY: HOUSING INSECURITY: WHAT IS YOUR LIVING SITUATION TODAY?: I HAVE A STEADY PLACE TO LIVE

## 2025-07-06 SDOH — ECONOMIC STABILITY: FOOD INSECURITY: WITHIN THE PAST 12 MONTHS, THE FOOD YOU BOUGHT JUST DIDN'T LAST AND YOU DIDN'T HAVE MONEY TO GET MORE.: NEVER TRUE

## 2025-07-06 ASSESSMENT — SOCIAL DETERMINANTS OF HEALTH (SDOH): IN THE PAST 12 MONTHS, HAS THE ELECTRIC, GAS, OIL, OR WATER COMPANY THREATENED TO SHUT OFF SERVICE IN YOUR HOME?: NO

## 2025-07-07 ENCOUNTER — LAB SERVICES (OUTPATIENT)
Dept: LAB | Age: 35
End: 2025-07-07

## 2025-07-07 ENCOUNTER — APPOINTMENT (OUTPATIENT)
Dept: INTERNAL MEDICINE | Age: 35
End: 2025-07-07

## 2025-07-07 ENCOUNTER — TELEPHONE (OUTPATIENT)
Dept: INTERNAL MEDICINE | Age: 35
End: 2025-07-07

## 2025-07-07 VITALS
WEIGHT: 124.34 LBS | BODY MASS INDEX: 19.98 KG/M2 | TEMPERATURE: 96.2 F | OXYGEN SATURATION: 100 % | HEART RATE: 54 BPM | RESPIRATION RATE: 18 BRPM | SYSTOLIC BLOOD PRESSURE: 96 MMHG | DIASTOLIC BLOOD PRESSURE: 61 MMHG | HEIGHT: 66 IN

## 2025-07-07 DIAGNOSIS — N63.24 MASS OF LOWER INNER QUADRANT OF LEFT BREAST: ICD-10-CM

## 2025-07-07 DIAGNOSIS — Z00.00 ANNUAL PHYSICAL EXAM: ICD-10-CM

## 2025-07-07 DIAGNOSIS — Z11.3 SCREEN FOR STD (SEXUALLY TRANSMITTED DISEASE): ICD-10-CM

## 2025-07-07 DIAGNOSIS — Z12.31 ENCOUNTER FOR SCREENING MAMMOGRAM FOR MALIGNANT NEOPLASM OF BREAST: ICD-10-CM

## 2025-07-07 DIAGNOSIS — Z00.00 ANNUAL PHYSICAL EXAM: Primary | ICD-10-CM

## 2025-07-07 PROCEDURE — 87591 N.GONORRHOEAE DNA AMP PROB: CPT | Performed by: CLINICAL MEDICAL LABORATORY

## 2025-07-07 PROCEDURE — 86592 SYPHILIS TEST NON-TREP QUAL: CPT | Performed by: CLINICAL MEDICAL LABORATORY

## 2025-07-07 PROCEDURE — 87389 HIV-1 AG W/HIV-1&-2 AB AG IA: CPT | Performed by: CLINICAL MEDICAL LABORATORY

## 2025-07-07 PROCEDURE — 80053 COMPREHEN METABOLIC PANEL: CPT | Performed by: CLINICAL MEDICAL LABORATORY

## 2025-07-07 PROCEDURE — 87491 CHLMYD TRACH DNA AMP PROBE: CPT | Performed by: CLINICAL MEDICAL LABORATORY

## 2025-07-07 PROCEDURE — 85025 COMPLETE CBC W/AUTO DIFF WBC: CPT | Performed by: CLINICAL MEDICAL LABORATORY

## 2025-07-07 PROCEDURE — 86706 HEP B SURFACE ANTIBODY: CPT | Performed by: CLINICAL MEDICAL LABORATORY

## 2025-07-07 PROCEDURE — 36415 COLL VENOUS BLD VENIPUNCTURE: CPT | Performed by: INTERNAL MEDICINE

## 2025-07-07 RX ORDER — NITROFURANTOIN 25; 75 MG/1; MG/1
CAPSULE ORAL
COMMUNITY
Start: 2025-01-20

## 2025-07-07 ASSESSMENT — PATIENT HEALTH QUESTIONNAIRE - PHQ9
2. FEELING DOWN, DEPRESSED OR HOPELESS: NOT AT ALL
SUM OF ALL RESPONSES TO PHQ9 QUESTIONS 1 AND 2: 0
SUM OF ALL RESPONSES TO PHQ9 QUESTIONS 1 AND 2: 0
1. LITTLE INTEREST OR PLEASURE IN DOING THINGS: NOT AT ALL
CLINICAL INTERPRETATION OF PHQ2 SCORE: NO FURTHER SCREENING NEEDED

## 2025-07-07 ASSESSMENT — PAIN SCALES - GENERAL: PAINLEVEL_OUTOF10: 0

## 2025-07-08 ENCOUNTER — RESULTS FOLLOW-UP (OUTPATIENT)
Dept: INTERNAL MEDICINE | Age: 35
End: 2025-07-08

## 2025-07-08 DIAGNOSIS — R92.8 ABNORMAL MAMMOGRAM: Primary | ICD-10-CM

## 2025-07-08 LAB
ALBUMIN SERPL-MCNC: 4 G/DL (ref 3.4–5)
ALBUMIN/GLOB SERPL: 1.4 {RATIO} (ref 1–2.4)
ALP SERPL-CCNC: 41 UNITS/L (ref 45–117)
ALT SERPL-CCNC: 23 UNITS/L
ANION GAP SERPL CALC-SCNC: 9 MMOL/L (ref 7–19)
AST SERPL-CCNC: 17 UNITS/L
BASOPHILS # BLD: 0.1 K/MCL (ref 0–0.3)
BASOPHILS NFR BLD: 1 %
BILIRUB SERPL-MCNC: 1.3 MG/DL (ref 0.2–1)
BUN SERPL-MCNC: 17 MG/DL (ref 6–20)
BUN/CREAT SERPL: 20 (ref 7–25)
C TRACH RRNA UR QL NAA+PROBE: NEGATIVE
CALCIUM SERPL-MCNC: 9.3 MG/DL (ref 8.4–10.2)
CHLORIDE SERPL-SCNC: 104 MMOL/L (ref 97–110)
CO2 SERPL-SCNC: 28 MMOL/L (ref 21–32)
CREAT SERPL-MCNC: 0.87 MG/DL (ref 0.51–0.95)
DEPRECATED RDW RBC: 43.7 FL (ref 39–50)
EGFRCR SERPLBLD CKD-EPI 2021: 90 ML/MIN/{1.73_M2}
EOSINOPHIL # BLD: 0 K/MCL (ref 0–0.5)
EOSINOPHIL NFR BLD: 1 %
ERYTHROCYTE [DISTWIDTH] IN BLOOD: 11.9 % (ref 11–15)
FASTING DURATION TIME PATIENT: 6 HOURS (ref 0–999)
GLOBULIN SER-MCNC: 2.8 G/DL (ref 2–4)
GLUCOSE SERPL-MCNC: 87 MG/DL (ref 70–99)
HBV SURFACE AB SER-ACNC: 137 MUNITS/ML
HCT VFR BLD CALC: 40.7 % (ref 36–46.5)
HGB BLD-MCNC: 13.8 G/DL (ref 12–15.5)
HIV 1+2 AB+HIV1 P24 AG SERPL QL IA: NONREACTIVE
IMM GRANULOCYTES # BLD AUTO: 0 K/MCL (ref 0–0.2)
IMM GRANULOCYTES # BLD: 0 %
LYMPHOCYTES # BLD: 2.5 K/MCL (ref 1–4.8)
LYMPHOCYTES NFR BLD: 37 %
MCH RBC QN AUTO: 33.5 PG (ref 26–34)
MCHC RBC AUTO-ENTMCNC: 33.9 G/DL (ref 32–36.5)
MCV RBC AUTO: 98.8 FL (ref 78–100)
MONOCYTES # BLD: 0.4 K/MCL (ref 0.3–0.9)
MONOCYTES NFR BLD: 6 %
N GONORRHOEA RRNA UR QL NAA+PROBE: NEGATIVE
NEUTROPHILS # BLD: 3.8 K/MCL (ref 1.8–7.7)
NEUTROPHILS NFR BLD: 55 %
NRBC BLD MANUAL-RTO: 0 /100 WBC
PLATELET # BLD AUTO: 269 K/MCL (ref 140–450)
POTASSIUM SERPL-SCNC: 4.4 MMOL/L (ref 3.4–5.1)
PROT SERPL-MCNC: 6.8 G/DL (ref 6.4–8.2)
RBC # BLD: 4.12 MIL/MCL (ref 4–5.2)
RPR SER QL: NONREACTIVE
SERVICE CMNT-IMP: NORMAL
SODIUM SERPL-SCNC: 137 MMOL/L (ref 135–145)
WBC # BLD: 6.7 K/MCL (ref 4.2–11)

## 2025-07-10 ENCOUNTER — HOSPITAL ENCOUNTER (OUTPATIENT)
Dept: CT IMAGING | Age: 35
Discharge: HOME | End: 2025-07-10
Attending: INTERNAL MEDICINE

## 2025-07-10 DIAGNOSIS — Z12.31 ENCOUNTER FOR SCREENING MAMMOGRAM FOR MALIGNANT NEOPLASM OF BREAST: ICD-10-CM

## 2025-07-10 DIAGNOSIS — N63.24 MASS OF LOWER INNER QUADRANT OF LEFT BREAST: ICD-10-CM

## 2025-07-10 PROCEDURE — 76642 ULTRASOUND BREAST LIMITED: CPT

## 2025-07-10 PROCEDURE — G0279 TOMOSYNTHESIS, MAMMO: HCPCS
